# Patient Record
Sex: FEMALE | Race: WHITE | NOT HISPANIC OR LATINO | Employment: UNEMPLOYED | URBAN - METROPOLITAN AREA
[De-identification: names, ages, dates, MRNs, and addresses within clinical notes are randomized per-mention and may not be internally consistent; named-entity substitution may affect disease eponyms.]

---

## 2021-07-19 ENCOUNTER — OFFICE VISIT (OUTPATIENT)
Dept: FAMILY MEDICINE CLINIC | Facility: CLINIC | Age: 71
End: 2021-07-19

## 2021-07-19 VITALS
SYSTOLIC BLOOD PRESSURE: 142 MMHG | BODY MASS INDEX: 39.22 KG/M2 | WEIGHT: 235.4 LBS | TEMPERATURE: 97.3 F | DIASTOLIC BLOOD PRESSURE: 72 MMHG | HEIGHT: 65 IN | OXYGEN SATURATION: 92 % | HEART RATE: 60 BPM

## 2021-07-19 DIAGNOSIS — Z72.0 TOBACCO ABUSE: Primary | ICD-10-CM

## 2021-07-19 DIAGNOSIS — Z87.891 PERSONAL HISTORY OF NICOTINE DEPENDENCE: ICD-10-CM

## 2021-07-19 DIAGNOSIS — G89.29 CHRONIC BILATERAL LOW BACK PAIN WITHOUT SCIATICA: ICD-10-CM

## 2021-07-19 DIAGNOSIS — F32.A ANXIETY AND DEPRESSION: ICD-10-CM

## 2021-07-19 DIAGNOSIS — I10 ESSENTIAL HYPERTENSION: ICD-10-CM

## 2021-07-19 DIAGNOSIS — M54.50 CHRONIC BILATERAL LOW BACK PAIN WITHOUT SCIATICA: ICD-10-CM

## 2021-07-19 DIAGNOSIS — Z12.11 COLON CANCER SCREENING: ICD-10-CM

## 2021-07-19 DIAGNOSIS — F41.9 ANXIETY AND DEPRESSION: ICD-10-CM

## 2021-07-19 LAB
ALBUMIN SERPL-MCNC: 4.1 G/DL (ref 3.5–5.2)
ALBUMIN/GLOB SERPL: 1.4 G/DL
ALP SERPL-CCNC: 95 U/L (ref 39–117)
ALT SERPL W P-5'-P-CCNC: 8 U/L (ref 1–33)
ANION GAP SERPL CALCULATED.3IONS-SCNC: 6.5 MMOL/L (ref 5–15)
AST SERPL-CCNC: 13 U/L (ref 1–32)
BASOPHILS # BLD AUTO: 0.06 10*3/MM3 (ref 0–0.2)
BASOPHILS NFR BLD AUTO: 0.7 % (ref 0–1.5)
BILIRUB SERPL-MCNC: 0.2 MG/DL (ref 0–1.2)
BUN SERPL-MCNC: 12 MG/DL (ref 8–23)
BUN/CREAT SERPL: 15.2 (ref 7–25)
CALCIUM SPEC-SCNC: 8.9 MG/DL (ref 8.6–10.5)
CHLORIDE SERPL-SCNC: 104 MMOL/L (ref 98–107)
CHOLEST SERPL-MCNC: 184 MG/DL (ref 0–200)
CO2 SERPL-SCNC: 29.5 MMOL/L (ref 22–29)
CREAT SERPL-MCNC: 0.79 MG/DL (ref 0.57–1)
DEPRECATED RDW RBC AUTO: 42.4 FL (ref 37–54)
EOSINOPHIL # BLD AUTO: 0.05 10*3/MM3 (ref 0–0.4)
EOSINOPHIL NFR BLD AUTO: 0.6 % (ref 0.3–6.2)
ERYTHROCYTE [DISTWIDTH] IN BLOOD BY AUTOMATED COUNT: 12.4 % (ref 12.3–15.4)
GFR SERPL CREATININE-BSD FRML MDRD: 71 ML/MIN/1.73
GLOBULIN UR ELPH-MCNC: 3 GM/DL
GLUCOSE SERPL-MCNC: 88 MG/DL (ref 65–99)
HCT VFR BLD AUTO: 44.3 % (ref 34–46.6)
HDLC SERPL-MCNC: 50 MG/DL (ref 40–60)
HGB BLD-MCNC: 14.9 G/DL (ref 12–15.9)
IMM GRANULOCYTES # BLD AUTO: 0.03 10*3/MM3 (ref 0–0.05)
IMM GRANULOCYTES NFR BLD AUTO: 0.3 % (ref 0–0.5)
LDLC SERPL CALC-MCNC: 126 MG/DL (ref 0–100)
LDLC/HDLC SERPL: 2.51 {RATIO}
LYMPHOCYTES # BLD AUTO: 2.3 10*3/MM3 (ref 0.7–3.1)
LYMPHOCYTES NFR BLD AUTO: 26.7 % (ref 19.6–45.3)
MCH RBC QN AUTO: 31.4 PG (ref 26.6–33)
MCHC RBC AUTO-ENTMCNC: 33.6 G/DL (ref 31.5–35.7)
MCV RBC AUTO: 93.3 FL (ref 79–97)
MONOCYTES # BLD AUTO: 0.65 10*3/MM3 (ref 0.1–0.9)
MONOCYTES NFR BLD AUTO: 7.5 % (ref 5–12)
NEUTROPHILS NFR BLD AUTO: 5.53 10*3/MM3 (ref 1.7–7)
NEUTROPHILS NFR BLD AUTO: 64.2 % (ref 42.7–76)
NRBC BLD AUTO-RTO: 0 /100 WBC (ref 0–0.2)
PLATELET # BLD AUTO: 285 10*3/MM3 (ref 140–450)
PMV BLD AUTO: 10.2 FL (ref 6–12)
POTASSIUM SERPL-SCNC: 4.6 MMOL/L (ref 3.5–5.2)
PROT SERPL-MCNC: 7.1 G/DL (ref 6–8.5)
RBC # BLD AUTO: 4.75 10*6/MM3 (ref 3.77–5.28)
SODIUM SERPL-SCNC: 140 MMOL/L (ref 136–145)
T4 FREE SERPL-MCNC: 1.15 NG/DL (ref 0.93–1.7)
TRIGL SERPL-MCNC: 43 MG/DL (ref 0–150)
TSH SERPL DL<=0.05 MIU/L-ACNC: 3.45 UIU/ML (ref 0.27–4.2)
VLDLC SERPL-MCNC: 8 MG/DL (ref 5–40)
WBC # BLD AUTO: 8.62 10*3/MM3 (ref 3.4–10.8)

## 2021-07-19 PROCEDURE — 36415 COLL VENOUS BLD VENIPUNCTURE: CPT | Performed by: FAMILY MEDICINE

## 2021-07-19 PROCEDURE — 85025 COMPLETE CBC W/AUTO DIFF WBC: CPT | Performed by: FAMILY MEDICINE

## 2021-07-19 PROCEDURE — 80053 COMPREHEN METABOLIC PANEL: CPT | Performed by: FAMILY MEDICINE

## 2021-07-19 PROCEDURE — 84439 ASSAY OF FREE THYROXINE: CPT | Performed by: FAMILY MEDICINE

## 2021-07-19 PROCEDURE — 84443 ASSAY THYROID STIM HORMONE: CPT | Performed by: FAMILY MEDICINE

## 2021-07-19 PROCEDURE — 99204 OFFICE O/P NEW MOD 45 MIN: CPT | Performed by: FAMILY MEDICINE

## 2021-07-19 PROCEDURE — 80061 LIPID PANEL: CPT | Performed by: FAMILY MEDICINE

## 2021-07-19 RX ORDER — SERTRALINE HYDROCHLORIDE 25 MG/1
25 TABLET, FILM COATED ORAL DAILY
Qty: 90 TABLET | Refills: 1 | Status: SHIPPED | OUTPATIENT
Start: 2021-07-19 | End: 2021-10-25

## 2021-07-19 RX ORDER — HYDROXYZINE PAMOATE 25 MG/1
25 CAPSULE ORAL EVERY 6 HOURS PRN
Qty: 360 CAPSULE | Refills: 1 | Status: SHIPPED | OUTPATIENT
Start: 2021-07-19 | End: 2021-08-02 | Stop reason: CLARIF

## 2021-07-19 RX ORDER — HYDROXYZINE PAMOATE 25 MG/1
25 CAPSULE ORAL EVERY 6 HOURS PRN
COMMUNITY
End: 2021-07-19 | Stop reason: SDUPTHER

## 2021-07-19 RX ORDER — ALBUTEROL SULFATE 90 UG/1
2 AEROSOL, METERED RESPIRATORY (INHALATION) EVERY 6 HOURS PRN
COMMUNITY

## 2021-07-19 RX ORDER — DILTIAZEM HYDROCHLORIDE 360 MG/1
360 CAPSULE, EXTENDED RELEASE ORAL DAILY
COMMUNITY
Start: 2021-06-03 | End: 2021-07-19 | Stop reason: SDUPTHER

## 2021-07-19 RX ORDER — DILTIAZEM HYDROCHLORIDE 360 MG/1
360 CAPSULE, EXTENDED RELEASE ORAL DAILY
Qty: 90 CAPSULE | Refills: 1 | Status: SHIPPED | OUTPATIENT
Start: 2021-07-19

## 2021-07-19 RX ORDER — ENALAPRIL MALEATE 20 MG/1
40 TABLET ORAL DAILY
COMMUNITY
Start: 2021-06-03 | End: 2021-07-19 | Stop reason: SDUPTHER

## 2021-07-19 RX ORDER — HYDROCHLOROTHIAZIDE 25 MG/1
25 TABLET ORAL DAILY
Qty: 90 TABLET | Refills: 1 | Status: SHIPPED | OUTPATIENT
Start: 2021-07-19 | End: 2021-10-25

## 2021-07-19 RX ORDER — TIZANIDINE 2 MG/1
2 TABLET ORAL EVERY 8 HOURS PRN
Qty: 30 TABLET | Refills: 1 | Status: SHIPPED | OUTPATIENT
Start: 2021-07-19 | End: 2021-07-29 | Stop reason: SDUPTHER

## 2021-07-19 RX ORDER — ENALAPRIL MALEATE 20 MG/1
40 TABLET ORAL DAILY
Qty: 180 TABLET | Refills: 1 | Status: SHIPPED | OUTPATIENT
Start: 2021-07-19 | End: 2021-08-26 | Stop reason: SDUPTHER

## 2021-07-19 RX ORDER — MELOXICAM 15 MG/1
15 TABLET ORAL DAILY
Qty: 10 TABLET | Refills: 0 | Status: SHIPPED | OUTPATIENT
Start: 2021-07-19 | End: 2021-07-29 | Stop reason: SDUPTHER

## 2021-07-19 RX ORDER — HYDROCHLOROTHIAZIDE 25 MG/1
25 TABLET ORAL DAILY
COMMUNITY
End: 2021-07-19 | Stop reason: SDUPTHER

## 2021-07-19 NOTE — PROGRESS NOTES
Venipuncture Blood Specimen Collection  Venipuncture performed in LEFT ARM by Kerry De Paz with good hemostasis. Patient tolerated the procedure well without complications.   07/19/21   Kerry De Paz

## 2021-07-20 NOTE — PROGRESS NOTES
Labs show no significant abnormalities except for mildly elevated cholesterol.  Watch diet and exercise.  Follow-up if you have any questions.

## 2021-07-28 ENCOUNTER — TREATMENT (OUTPATIENT)
Dept: PHYSICAL THERAPY | Facility: CLINIC | Age: 71
End: 2021-07-28

## 2021-07-28 DIAGNOSIS — M54.50 CHRONIC BILATERAL LOW BACK PAIN, UNSPECIFIED WHETHER SCIATICA PRESENT: Primary | ICD-10-CM

## 2021-07-28 DIAGNOSIS — R26.9 GAIT DISTURBANCE: ICD-10-CM

## 2021-07-28 DIAGNOSIS — G89.29 CHRONIC BILATERAL LOW BACK PAIN, UNSPECIFIED WHETHER SCIATICA PRESENT: Primary | ICD-10-CM

## 2021-07-28 DIAGNOSIS — M25.561 RIGHT KNEE PAIN, UNSPECIFIED CHRONICITY: ICD-10-CM

## 2021-07-28 PROCEDURE — 97161 PT EVAL LOW COMPLEX 20 MIN: CPT | Performed by: PHYSICAL THERAPIST

## 2021-07-28 PROCEDURE — 97110 THERAPEUTIC EXERCISES: CPT | Performed by: PHYSICAL THERAPIST

## 2021-07-28 NOTE — PROGRESS NOTES
Physical Therapy Initial Evaluation and Plan of Care      Patient: Kallie Rosas   : 1950  Diagnosis/ICD-10 Code:  Chronic bilateral low back pain, unspecified whether sciatica present [M54.5, G89.29]  Referring practitioner: Abner Lopez MD  Date of Initial Visit: 2021  Today's Date: 2021  Patient seen for 1 sessions    Progress note due: 2021  Re-cert due: 10/26/2021           Subjective Questionnaire: Oswestry:       Subjective Evaluation    History of Present Illness    Subjective comment: Pt states she was been having low back pain for a while. The pain stays in her lower back and does not radiate down. She reports having R knee pain and is gives out occasionaly. She reports being able to stand about 10 minutes before having to sit down.  She has cane at home she uses on uneven grounds.  Patient Occupation: retired Pain  Current pain ratin  At best pain ratin  At worst pain rating: 10  Quality: throbbing  Relieving factors: change in position and rest  Aggravating factors: ambulation, lifting, standing, prolonged positioning and repetitive movement    Diagnostic Tests  X-ray: abnormal (levosciolosis )    Treatments  Previous treatment: chiropractic  Patient Goals  Patient goals for therapy: increased strength, independence with ADLs/IADLs, decreased pain and increased motion             Objective          Static Posture     Head  Forward.    Thoracic Spine  Hyperkyphosis.    Lumbar Spine   Increased lordosis.   Lumbar spine (Left): Convex curve.     Pelvis   Anterior pelvic tilt    Hip   Hip (Left): Internally rotated.     Knee   Genu valgus.     Palpation   Left   Tenderness of the erector spinae.     Right   Muscle spasm in the erector spinae.     Tenderness     Lumbar Spine  Tenderness in the spinous process.     Active Range of Motion   Left Knee   Normal active range of motion    Right Knee   Normal active range of motion    Additional Active Range of Motion  Details  75% of WFL for AROM in all directions     Strength/Myotome Testing     Left Hip   Planes of Motion   Flexion: 4  Abduction: 4-  Adduction: 4    Right Hip   Planes of Motion   Flexion: 4-  Abduction: 4-  Adduction: 4    Left Knee   Flexion: 4  Extension: 4-    Right Knee   Flexion: 4  Extension: 4-              Assessment & Plan     Assessment  Impairments: abnormal gait, abnormal or restricted ROM, activity intolerance, impaired physical strength, lacks appropriate home exercise program and pain with function  Assessment details: Pt presents with chronic low back pain and R knee pain that worsens with standing, walking, and chores at home. Pt demonstrates BLE weakness and weakness in core and poor posture. Pt will benefit from skilled PT to address functional deficits and return to PLOF.       Prognosis: good  Functional Limitations: lifting, walking, pulling, pushing, uncomfortable because of pain, standing, stooping and unable to perform repetitive tasks  Goals  Plan Goals: 1. The patient has complaints of pain.   LTG 1: 6 weeks:  The patient will report lower back pain no greater than 2 in order to improve tolerance with activities of daily living and improve sleep quality.    STATUS:  New   STG 1a: 3 weeks:  The patient will report lower back pain no greater than 4.     STATUS:  New   TREATMENT:  Manual therapy, therapeutic exercise, home exercise instruction, aquatic therapy, pelvic traction, and modalities as needed to include: electrical stimulation, ultrasound, moist heat, and ice.     2. Decreased B hip weakness    LTG 2: 6 weeks: Pt will improve bilateral hip flexor and abduction strength to 4+/5 to improve BLE function.     STATUS:  New     STG 2a: 3 weeks:  Pt will improve bilateral hip flexor and abduction strength to 4/5 to improve BLE function    STATUS:  New   TREATMENT: Manual therapy, therapeutic exercise, home exercise instruction, aquatic therapy, pelvic traction, and modalities as  needed to include: electrical stimulation, ultrasound, moist heat, and ice.        3. Mobility: Walking/Moving Around Functional Limitation     LTG 3: 6 weeks:  The patient will demonstrate  ANNE score to 5 to decrease limitation.     STATUS:  New   STG 3a: 3 weeks:  The patient will demonstrate  ANNE score to 10 to decrease limitation.    STATUS:  New   TREATMENT:  Manual therapy, therapeutic exercise, home exercise instruction, and modalities as needed to include: moist heat, electrical stimulation, and ultrasound.      Plan  Therapy options: will be seen for skilled physical therapy services  Planned therapy interventions: flexibility, functional ROM exercises, home exercise program, joint mobilization, manual therapy, neuromuscular re-education, soft tissue mobilization, strengthening, stretching, therapeutic activities and abdominal trunk stabilization  Frequency: 2x week  Duration in weeks: 6  Treatment plan discussed with: patient and family        Visit Diagnoses:    ICD-10-CM ICD-9-CM   1. Chronic bilateral low back pain, unspecified whether sciatica present  M54.5 724.2    G89.29 338.29   2. Right knee pain, unspecified chronicity  M25.561 719.46   3. Gait disturbance  R26.9 781.2       Timed:         Manual Therapy:    0     mins  56537;     Therapeutic Exercise:    8     mins  25400;     Neuromuscular Jairo:    0    mins  42302;    Therapeutic Activity:     0     mins  47836;     Gait Trainin     mins  55688;     Ultrasound:     0     mins  11169;    Ionto                               0    mins   53376  Self-care  __0__ mins 00445    Un-Timed:  Electrical Stimulation:    0     mins  45403 ( );  Traction     0     mins 85940  Low Eval     40    Mins  50221  Mod Eval     0     Mins  70269  High Eval                       0     Mins  54078  Hot pack     0     Mins    Cold pack                       0     Mins      Timed Treatment:   8   mins   Total Treatment:     40   mins    PT SIGNATURE:  Jimy Owens, PT, DPT        Initial Certification  Certification Period: 7/28/2021 thru 10/26/2021  I certify that the therapy services are furnished while this patient is under my care.  The services outlined above are required by this patient, and will be reviewed every 90 days.     PHYSICIAN: Abner Lopez MD      DATE:     Please sign and return via fax to [phone].. Thank you, [facility].

## 2021-07-29 DIAGNOSIS — M54.50 CHRONIC BILATERAL LOW BACK PAIN WITHOUT SCIATICA: ICD-10-CM

## 2021-07-29 DIAGNOSIS — I10 ESSENTIAL HYPERTENSION: ICD-10-CM

## 2021-07-29 DIAGNOSIS — G89.29 CHRONIC BILATERAL LOW BACK PAIN WITHOUT SCIATICA: ICD-10-CM

## 2021-07-29 RX ORDER — MELOXICAM 15 MG/1
15 TABLET ORAL DAILY
Qty: 10 TABLET | Refills: 0 | Status: SHIPPED | OUTPATIENT
Start: 2021-07-29 | End: 2021-08-02 | Stop reason: SDUPTHER

## 2021-07-29 RX ORDER — TIZANIDINE 2 MG/1
2 TABLET ORAL EVERY 8 HOURS PRN
Qty: 30 TABLET | Refills: 1 | Status: SHIPPED | OUTPATIENT
Start: 2021-07-29 | End: 2021-08-02 | Stop reason: SDUPTHER

## 2021-07-29 NOTE — TELEPHONE ENCOUNTER
Caller: OLI LUCEROANTONI    Relationship: Emergency Contact    Best call back number: [phone]       Medication needed:   Requested Prescriptions     Pending Prescriptions Disp Refills   • meloxicam (Mobic) 15 MG tablet 10 tablet 0     Sig: Take 1 tablet by mouth Daily.   • tiZANidine (ZANAFLEX) 2 MG tablet 30 tablet 1     Sig: Take 1 tablet by mouth Every 8 (Eight) Hours As Needed for Muscle Spasms.       When do you need the refill by: ASAP    What additional details did the patient provide when requesting the medication: PATIENT IS OUT OF MELOXICAM    Does the patient have less than a 3 day supply:  [x] Yes  [] No    What is the patient's preferred pharmacy: [facility] - [address] [phone] [place] [phone]

## 2021-08-02 ENCOUNTER — OFFICE VISIT (OUTPATIENT)
Dept: FAMILY MEDICINE CLINIC | Facility: CLINIC | Age: 71
End: 2021-08-02

## 2021-08-02 VITALS
SYSTOLIC BLOOD PRESSURE: 122 MMHG | TEMPERATURE: 97.1 F | OXYGEN SATURATION: 95 % | BODY MASS INDEX: 38.21 KG/M2 | HEART RATE: 81 BPM | DIASTOLIC BLOOD PRESSURE: 72 MMHG | WEIGHT: 229.6 LBS

## 2021-08-02 DIAGNOSIS — F41.9 ANXIETY AND DEPRESSION: ICD-10-CM

## 2021-08-02 DIAGNOSIS — I10 ESSENTIAL HYPERTENSION: Primary | ICD-10-CM

## 2021-08-02 DIAGNOSIS — G89.29 CHRONIC BILATERAL LOW BACK PAIN WITHOUT SCIATICA: ICD-10-CM

## 2021-08-02 DIAGNOSIS — F32.A ANXIETY AND DEPRESSION: ICD-10-CM

## 2021-08-02 DIAGNOSIS — M54.50 CHRONIC BILATERAL LOW BACK PAIN WITHOUT SCIATICA: ICD-10-CM

## 2021-08-02 PROCEDURE — 99214 OFFICE O/P EST MOD 30 MIN: CPT | Performed by: FAMILY MEDICINE

## 2021-08-02 RX ORDER — TIZANIDINE 2 MG/1
2 TABLET ORAL EVERY 8 HOURS PRN
Qty: 60 TABLET | Refills: 5 | Status: SHIPPED | OUTPATIENT
Start: 2021-08-02 | End: 2021-08-26 | Stop reason: SDUPTHER

## 2021-08-02 RX ORDER — MELOXICAM 7.5 MG/1
7.5 TABLET ORAL DAILY
Qty: 30 TABLET | Refills: 5 | Status: SHIPPED | OUTPATIENT
Start: 2021-08-02 | End: 2021-08-26 | Stop reason: SDUPTHER

## 2021-08-02 NOTE — PROGRESS NOTES
Chief Complaint  Hypertension (2 follow up), Anxiety, and Depression    Subjective          Kallie Rosas presents to [facility]  Pt says tizanidine and mobic helps back pain a lot- she has no h/o GI bleed or ulcers, per pt- discussed risk of GI side effects of bleed and ulcer with mobic- she will try lower dose regularily    Hypertension  This is a chronic problem. The current episode started more than 1 year ago. The problem has been resolved since onset. The problem is controlled. Associated symptoms include anxiety. Pertinent negatives include no blurred vision, chest pain, headaches, malaise/fatigue, neck pain, orthopnea, palpitations, peripheral edema, PND, shortness of breath or sweats. There are no associated agents to hypertension. Risk factors for coronary artery disease include post-menopausal state. Current antihypertension treatment includes calcium channel blockers, ACE inhibitors and diuretics. The current treatment provides significant improvement. There are no compliance problems.    Anxiety  Presents for follow-up visit. Patient reports no chest pain, compulsions, confusion, decreased concentration, depressed mood, dizziness, dry mouth, excessive worry, feeling of choking, hyperventilation, insomnia, irritability, malaise, muscle tension, nausea, nervous/anxious behavior, obsessions, palpitations, panic, restlessness, shortness of breath or suicidal ideas. Symptoms occur occasionally. The severity of symptoms is moderate. The quality of sleep is good. Nighttime awakenings: none.     Her past medical history is significant for depression. Compliance with medications is %. Treatment side effects: no side effects.   Depression  Visit Type: follow-up  Patient is not experiencing: anhedonia, chest pain, choking sensation, compulsions, confusion, decreased concentration, depressed mood, dizziness, dry mouth, excessive worry, fatigue, feelings of hopelessness, feelings  of worthlessness, hypersomnia, hyperventilation, insomnia, irritability, malaise, memory impairment, muscle tension, nausea, nervousness/anxiety, obsessions, palpitations, panic, restlessness, shortness of breath, suicidal ideas, suicidal planning, thoughts of death, weight gain and weight loss.  Frequency of symptoms: occasionally   Severity: moderate   Sleep quality: good  Nighttime awakenings: none  Compliance with medications:  %  Treatment side effects: no side effects.      Objective   No Known Allergies    There is no immunization history on file for this patient.    Vital Signs:   Vitals:    08/02/21 1036   BP: 122/72   Pulse: 81   Temp: 97.1 °F (36.2 °C)   SpO2: 95%   Weight: 104 kg (229 lb 9.6 oz)       Physical Exam  Vitals reviewed.   Constitutional:       Appearance: Normal appearance. She is well-developed.   HENT:      Head: Normocephalic and atraumatic.      Right Ear: External ear normal.      Left Ear: External ear normal.      Mouth/Throat:      Pharynx: No oropharyngeal exudate.   Eyes:      Conjunctiva/sclera: Conjunctivae normal.      Pupils: Pupils are equal, round, and reactive to light.   Cardiovascular:      Rate and Rhythm: Normal rate and regular rhythm.      Pulses: Normal pulses.      Heart sounds: Normal heart sounds. No murmur heard.   No friction rub. No gallop.    Pulmonary:      Effort: Pulmonary effort is normal.      Breath sounds: Normal breath sounds. No wheezing or rhonchi.   Abdominal:      General: Bowel sounds are normal. There is no distension.      Palpations: Abdomen is soft.      Tenderness: There is no abdominal tenderness.   Skin:     General: Skin is warm and dry.   Neurological:      Mental Status: She is alert and oriented to person, place, and time.      Cranial Nerves: No cranial nerve deficit.   Psychiatric:         Mood and Affect: Mood and affect normal.         Behavior: Behavior normal.         Thought Content: Thought content normal.         Judgment:  Judgment normal.        Result Review :   The following data was reviewed by: Abner Lopez MD on 08/02/2021:  CMP    CMP 7/19/21   Glucose 88   BUN 12   Creatinine 0.79   eGFR Non African Am 71   Sodium 140   Potassium 4.6   Chloride 104   Calcium 8.9   Albumin 4.10   Total Bilirubin 0.2   Alkaline Phosphatase 95   AST (SGOT) 13   ALT (SGPT) 8           CBC    CBC 7/19/21   WBC 8.62   RBC 4.75   Hemoglobin 14.9   Hematocrit 44.3   MCV 93.3   MCH 31.4   MCHC 33.6   RDW 12.4   Platelets 285           Lipid Panel    Lipid Panel 7/19/21   Total Cholesterol 184   Triglycerides 43   HDL Cholesterol 50   VLDL Cholesterol 8   LDL Cholesterol  126 (A)   LDL/HDL Ratio 2.51   (A) Abnormal value            TSH    TSH 7/19/21   TSH 3.450                     Assessment and Plan    Diagnoses and all orders for this visit:    1. Essential hypertension (Primary)    2. Chronic bilateral low back pain without sciatica  -     meloxicam (Mobic) 7.5 MG tablet; Take 1 tablet by mouth Daily.  Dispense: 30 tablet; Refill: 5  -     tiZANidine (ZANAFLEX) 2 MG tablet; Take 1 tablet by mouth Every 8 (Eight) Hours As Needed for Muscle Spasms.  Dispense: 60 tablet; Refill: 5    3. Anxiety and depression            Follow Up   Return in about 6 months (around 2/2/2022) for Recheck.  Patient was given instructions and counseling regarding her condition or for health maintenance advice. Please see specific information pulled into the AVS if appropriate. Will continue HTN, anxiety and depression meds.

## 2021-08-03 ENCOUNTER — HOSPITAL ENCOUNTER (OUTPATIENT)
Dept: CARDIOLOGY | Facility: HOSPITAL | Age: 71
Discharge: HOME | End: 2021-08-03

## 2021-08-03 ENCOUNTER — HOSPITAL ENCOUNTER (OUTPATIENT)
Dept: CT IMAGING | Facility: HOSPITAL | Age: 71
Discharge: HOME | End: 2021-08-03

## 2021-08-03 DIAGNOSIS — Z72.0 TOBACCO ABUSE: ICD-10-CM

## 2021-08-03 DIAGNOSIS — Z87.891 PERSONAL HISTORY OF NICOTINE DEPENDENCE: ICD-10-CM

## 2021-08-03 DIAGNOSIS — I10 ESSENTIAL HYPERTENSION: ICD-10-CM

## 2021-08-03 LAB
BH CV ECHO MEAS - DIST REN A EDV LEFT: 21.1 CM/S
BH CV ECHO MEAS - DIST REN A PSV LEFT: 75 CM/S
BH CV ECHO MEAS - MID REN A EDV LEFT: 32.8 CM/S
BH CV ECHO MEAS - MID REN A PSV LEFT: 91.8 CM/S
BH CV ECHO MEAS - PROX REN A EDV LEFT: 19 CM/S
BH CV ECHO MEAS - PROX REN A PSV LEFT: 115 CM/S
BH CV VAS BP LEFT ARM: NORMAL MMHG
BH CV VAS BP RIGHT ARM: NORMAL MMHG
BH CV VAS KIDNEY HEIGHT LEFT: 4.79 CM
BH CV VAS RENAL AORTIC MID PSV: 68.8 CM/S
BH CV VAS RENAL HILUM LEFT EDV: 6.67 CM/S
BH CV VAS RENAL HILUM LEFT PSV: 15.2 CM/S
BH CV VAS RENAL HILUM RIGHT EDV: 9.86 CM/S
BH CV VAS RENAL HILUM RIGHT PSV: 27 CM/S
BH CV XLRA MEAS - KID L LEFT: 9.2 CM
BH CV XLRA MEAS DIST REN A EDV RIGHT: 36.2 CM/S
BH CV XLRA MEAS DIST REN A PSV RIGHT: 123 CM/S
BH CV XLRA MEAS KID H RIGHT: 3.76 CM
BH CV XLRA MEAS KID L RIGHT: 9.99 CM
BH CV XLRA MEAS KID W RIGHT: 6.71 CM
BH CV XLRA MEAS MID REN A EDV RIGHT: 39.2 CM/S
BH CV XLRA MEAS MID REN A PSV RIGHT: 151 CM/S
BH CV XLRA MEAS PROX REN A EDV RIGHT: 37 CM/S
BH CV XLRA MEAS PROX REN A PSV RIGHT: 130 CM/S
BH CV XLRA MEAS RAR LEFT: 1.71
BH CV XLRA MEAS RAR RIGHT: 2.22
BH CV XLRA MEAS RENAL A ORG EDV LEFT: 26 CM/S
BH CV XLRA MEAS RENAL A ORG EDV RIGHT: 30.3 CM/S
BH CV XLRA MEAS RENAL A ORG PSV LEFT: 118 CM/S
BH CV XLRA MEAS RENAL A ORG PSV RIGHT: 123 CM/S
LEFT KIDNEY WIDTH: 4.26 CM
LEFT RENAL UPPER PARENCHYMA MAX: 33.4 CM/S
LEFT RENAL UPPER PARENCHYMA MIN: 10 CM/S
LEFT RENAL UPPER PARENCHYMA RI: 0.7
MAXIMAL PREDICTED HEART RATE: 147 BPM
RIGHT RENAL UPPER PARENCHYMA MAX: 41 CM/S
RIGHT RENAL UPPER PARENCHYMA MIN: 14.5 CM/S
RIGHT RENAL UPPER PARENCHYMA RI: 0.65
STRESS TARGET HR: 125 BPM

## 2021-08-03 PROCEDURE — 93975 VASCULAR STUDY: CPT | Performed by: SURGERY

## 2021-08-03 PROCEDURE — 93975 VASCULAR STUDY: CPT

## 2021-08-03 PROCEDURE — 71271 CT THORAX LUNG CANCER SCR C-: CPT

## 2021-08-04 ENCOUNTER — TREATMENT (OUTPATIENT)
Dept: PHYSICAL THERAPY | Facility: CLINIC | Age: 71
End: 2021-08-04

## 2021-08-04 DIAGNOSIS — R26.9 GAIT DISTURBANCE: ICD-10-CM

## 2021-08-04 DIAGNOSIS — M25.561 RIGHT KNEE PAIN, UNSPECIFIED CHRONICITY: ICD-10-CM

## 2021-08-04 DIAGNOSIS — G89.29 CHRONIC BILATERAL LOW BACK PAIN, UNSPECIFIED WHETHER SCIATICA PRESENT: Primary | ICD-10-CM

## 2021-08-04 DIAGNOSIS — M54.50 CHRONIC BILATERAL LOW BACK PAIN, UNSPECIFIED WHETHER SCIATICA PRESENT: Primary | ICD-10-CM

## 2021-08-04 PROCEDURE — 97110 THERAPEUTIC EXERCISES: CPT | Performed by: PHYSICAL THERAPIST

## 2021-08-04 NOTE — PROGRESS NOTES
Physical Therapy Daily Treatment Note      Patient: Kallie Rosas   : 1950  Referring practitioner: No ref. provider found  Date of Initial Visit: Type: THERAPY  Noted: 2021  Today's Date: 2021  Patient seen for 2 sessions         Kallie Rosas reports: her symptoms are little bit better. No complaints of low back or R knee pain right now. 0/10.       Subjective     Objective   See Exercise, Manual, and Modality Logs for complete treatment.       Assessment & Plan     Assessment  Assessment details: Pt tolerated session but unable to tolerate supine or recumbent position for exercises due to discomfort. Pt demonstrated significant bilateral knee valgus during sit to stands indicating hip abductor weakness. Pt reports no low back or R knee pain today. Progress patient next visit as tolerated.         Visit Diagnoses:    ICD-10-CM ICD-9-CM   1. Chronic bilateral low back pain, unspecified whether sciatica present  M54.5 724.2    G89.29 338.29   2. Right knee pain, unspecified chronicity  M25.561 719.46   3. Gait disturbance  R26.9 781.2       Progress per Plan of Care           Timed:         Manual Therapy:    0     mins  80884;     Therapeutic Exercise:    30     mins  03657;     Neuromuscular Jairo:    0    mins  43519;    Therapeutic Activity:     0     mins  28976;     Gait Trainin     mins  20623;     Ultrasound:     0     mins  64220;    Ionto                               0    mins   92366  Self-care  __0__ mins 73702    Un-Timed:  Electrical Stimulation:    0     mins  64485 (MC );  Traction     0     mins 73186  Hot pack     0     Mins    Cold pack                       0     Mins      Timed Treatment:   30   mins   Total Treatment:     30   mins    Jimy Owens PT, DPT  Physical Therapist

## 2021-08-06 ENCOUNTER — TREATMENT (OUTPATIENT)
Dept: PHYSICAL THERAPY | Facility: CLINIC | Age: 71
End: 2021-08-06

## 2021-08-06 DIAGNOSIS — G89.29 CHRONIC BILATERAL LOW BACK PAIN, UNSPECIFIED WHETHER SCIATICA PRESENT: Primary | ICD-10-CM

## 2021-08-06 DIAGNOSIS — M54.50 CHRONIC BILATERAL LOW BACK PAIN, UNSPECIFIED WHETHER SCIATICA PRESENT: Primary | ICD-10-CM

## 2021-08-06 DIAGNOSIS — M25.561 RIGHT KNEE PAIN, UNSPECIFIED CHRONICITY: ICD-10-CM

## 2021-08-06 DIAGNOSIS — R26.9 GAIT DISTURBANCE: ICD-10-CM

## 2021-08-06 PROCEDURE — 97110 THERAPEUTIC EXERCISES: CPT | Performed by: PHYSICAL THERAPIST

## 2021-08-06 NOTE — PROGRESS NOTES
Physical Therapy Daily Treatment Note      Patient: Kallie Rosas   : 1950  Referring practitioner: Abner Lopez MD  Date of Initial Visit: Type: THERAPY  Noted: 2021  Today's Date: 2021  Patient seen for 3 sessions         Kallie Rosas reports: no complaints of R knee or low back pain today.       Subjective     Objective   See Exercise, Manual, and Modality Logs for complete treatment.       Assessment & Plan     Assessment  Assessment details: Pt tolerated progression of LE strengthening exercises well with pain or discomfort reported. Progress patient next visit as tolerated.         Visit Diagnoses:    ICD-10-CM ICD-9-CM   1. Chronic bilateral low back pain, unspecified whether sciatica present  M54.5 724.2    G89.29 338.29   2. Right knee pain, unspecified chronicity  M25.561 719.46   3. Gait disturbance  R26.9 781.2       Progress per Plan of Care           Timed:         Manual Therapy:    0     mins  22058;     Therapeutic Exercise:    30     mins  48339;     Neuromuscular Jairo:    0    mins  56478;    Therapeutic Activity:     0     mins  48458;     Gait Trainin     mins  16313;     Ultrasound:     0     mins  85877;    Ionto                               0    mins   93930  Self-care  __0__ mins 33869    Un-Timed:  Electrical Stimulation:    0     mins  75794 (MC );  Traction     0     mins 85410  Hot pack     0     Mins    Cold pack                       0     Mins      Timed Treatment:   30   mins   Total Treatment:     30   mins    Jimy Owens PT, DPT  Physical Therapist

## 2021-08-09 ENCOUNTER — CLINICAL SUPPORT (OUTPATIENT)
Dept: GASTROENTEROLOGY | Facility: CLINIC | Age: 71
End: 2021-08-09

## 2021-08-09 ENCOUNTER — PREP FOR SURGERY (OUTPATIENT)
Dept: OTHER | Facility: HOSPITAL | Age: 71
End: 2021-08-09

## 2021-08-09 DIAGNOSIS — Z12.11 ENCOUNTER FOR SCREENING FOR MALIGNANT NEOPLASM OF COLON: Primary | ICD-10-CM

## 2021-08-11 ENCOUNTER — TREATMENT (OUTPATIENT)
Dept: PHYSICAL THERAPY | Facility: CLINIC | Age: 71
End: 2021-08-11

## 2021-08-11 DIAGNOSIS — R26.9 GAIT DISTURBANCE: ICD-10-CM

## 2021-08-11 DIAGNOSIS — M25.561 RIGHT KNEE PAIN, UNSPECIFIED CHRONICITY: ICD-10-CM

## 2021-08-11 DIAGNOSIS — G89.29 CHRONIC BILATERAL LOW BACK PAIN, UNSPECIFIED WHETHER SCIATICA PRESENT: Primary | ICD-10-CM

## 2021-08-11 DIAGNOSIS — M54.50 CHRONIC BILATERAL LOW BACK PAIN, UNSPECIFIED WHETHER SCIATICA PRESENT: Primary | ICD-10-CM

## 2021-08-11 PROBLEM — Z12.11 ENCOUNTER FOR SCREENING FOR MALIGNANT NEOPLASM OF COLON: Status: ACTIVE | Noted: 2021-08-11

## 2021-08-11 PROCEDURE — 97140 MANUAL THERAPY 1/> REGIONS: CPT | Performed by: PHYSICAL THERAPIST

## 2021-08-11 PROCEDURE — 97110 THERAPEUTIC EXERCISES: CPT | Performed by: PHYSICAL THERAPIST

## 2021-08-11 NOTE — PROGRESS NOTES
Physical Therapy Daily Progress Note        Patient: Kallie Rosas   : 1950  Diagnosis/ICD-10 Code:  Chronic bilateral low back pain, unspecified whether sciatica present [M54.5, G89.29]  Referring practitioner: Abner Lopez MD  Date of Initial Visit: Type: THERAPY  Noted: 2021  Today's Date: 2021  Patient seen for 4 sessions             Subjective   Kallie Rosas reports having increased pain in left lower back into left leg.  She rates her pain at 9/10 upon arrival.    Objective     Hip PROM:  IR:       L:  10 degrees    R:  20 degrees  ER:     L:  30 degrees     R:  22 degrees    Postural Observations:  Forward flexed trunk-bent knee posture with bilateral Genuvalgum        See Exercise and Manual Logs for complete treatment.       Assessment/Plan  Pt tolerated therapy session moderately well today- with performance of therapeutic exercises, gentle CKC-Functional activities, and Manual therapy. She has improved, but continues to have deficits in Trunk and Bilateral LEs ROM,  Strength, and Stability; limiting function and ability to perform ADLs at this time.  Held Steps ups and bike today secondary to 9/10 pain upon arrival.  Added manual stretching today in effort to decrease pain and tightness in left lower back-hip.  She responded well to therapy interventions - reporting decrease in pain post session.    Plan to continue to progress pt as tolerated as per POC.           Timed:  Manual Therapy:    10     mins  39085;  Therapeutic Exercise:    30     mins  27263;     Neuromuscular Jairo:    0    mins  06665;    Therapeutic Activity:     0     mins  91961;     Gait Trainin     mins  53724;     Ultrasound:     0     mins  04338;    Electrical Stimulation:    0     mins  48240;  Iontophoresis     0     mins  28088    Untimed:  Electrical Stimulation:    0     mins  17352 ( );  Mechanical Traction:    0     mins  78091;   Fluidotherapy     0     mins  13935  Hot pack     0     mins   72077  Cold pack     0     mins  89713    Timed Treatment:   40   mins   Total Treatment:     40   mins        Maira Robles PTA  Physical Therapist Assistant

## 2021-08-18 ENCOUNTER — TELEPHONE (OUTPATIENT)
Dept: FAMILY MEDICINE CLINIC | Facility: CLINIC | Age: 71
End: 2021-08-18

## 2021-08-19 ENCOUNTER — TREATMENT (OUTPATIENT)
Dept: PHYSICAL THERAPY | Facility: CLINIC | Age: 71
End: 2021-08-19

## 2021-08-19 DIAGNOSIS — R26.9 GAIT DISTURBANCE: ICD-10-CM

## 2021-08-19 DIAGNOSIS — G89.29 CHRONIC BILATERAL LOW BACK PAIN, UNSPECIFIED WHETHER SCIATICA PRESENT: Primary | ICD-10-CM

## 2021-08-19 DIAGNOSIS — M54.50 CHRONIC BILATERAL LOW BACK PAIN, UNSPECIFIED WHETHER SCIATICA PRESENT: Primary | ICD-10-CM

## 2021-08-19 DIAGNOSIS — M19.90 ARTHRITIS: Primary | ICD-10-CM

## 2021-08-19 DIAGNOSIS — M25.561 RIGHT KNEE PAIN, UNSPECIFIED CHRONICITY: ICD-10-CM

## 2021-08-19 PROCEDURE — 97110 THERAPEUTIC EXERCISES: CPT | Performed by: PHYSICAL THERAPIST

## 2021-08-19 NOTE — PROGRESS NOTES
Physical Therapy Daily Treatment Note      Patient: Kallie Rosas   : 1950  Referring practitioner: Abner Lopez MD  Date of Initial Visit: Type: THERAPY  Noted: 2021  Today's Date: 2021  Patient seen for 5 sessions         Kallie Rosas reports: she's been hardly able to walk since the last couple sessions due to the pain. She says she felt fine during the session but she was having increased pain in the left hip and knee which continued everyday afterward. She was unable to make last appointment on Friday because of the pain and wasn't sure if she would be able to continue with physical therapy. She is trying to get a hold of Dr. Lopez to discuss her increased pain and get an order for a cane. She would like to be able to walk and stand longer in order to go to the grocery store and go on walks with her friend.       Subjective     Objective          Static Posture     Hip   Hip (Left): Internally rotated.   Hip (Right): Internally rotated.     Knee   Genu valgus.   Knee (Left): Flexed.   Knee (Right): Flexed.     Ambulation   Weight-Bearing Status   Assistive device used: single point cane    Ambulation: Level Surfaces   Ambulation with assistive device: independent  Ambulation without assistive device: independent    Observational Gait   Gait: antalgic   Decreased walking speed, stride length, left stance time, left swing time and left step length.   Left foot contact pattern: forefoot  Left arm swing: decreased  Right arm swing: decreased  Base of support: decreased    Additional Observational Gait Details  B knee valgus    Quality of Movement During Gait   Trunk  Forward lean.     Knee    Knee (Left): Positive valgus.   Knee (Right): Positive valgus.       See Exercise, Manual, and Modality Logs for complete treatment.       Assessment & Plan     Assessment  Assessment details: Increased  L stride length and stance stance time noted when implementing single point cane on the right side. Pt  has improved  Erect posture and decreased forward trunk flexion. Pt also report feeling more stable. Pt also tolerated manual therapy and exercises and reports decrease in pain in the L knee and hip. She also says she feels more stable and less painful with a cane.         Visit Diagnoses:    ICD-10-CM ICD-9-CM   1. Chronic bilateral low back pain, unspecified whether sciatica present  M54.5 724.2    G89.29 338.29   2. Right knee pain, unspecified chronicity  M25.561 719.46   3. Gait disturbance  R26.9 781.2       Continue with same treatment next visit.            Timed:         Manual Therapy:    10     mins  15361;     Therapeutic Exercise:   5 mins  68063;     Neuromuscular Jairo:    0    mins  80886;    Therapeutic Activity:     0     mins  19698;     Gait Trainin     mins  92931;     Ultrasound:     0     mins  13262;    Ionto                               0    mins   59269  Self-care  __0__ mins 12591    Un-Timed:  Electrical Stimulation:    0     mins  80621 (MC );  Traction     0     mins 30635  Hot pack     0     Mins    Cold pack                       0     Mins      Timed Treatment:   17   mins   Total Treatment:     17   mins    Jimy Owens PT, DPT  Physical Therapist

## 2021-08-26 ENCOUNTER — TELEPHONE (OUTPATIENT)
Dept: FAMILY MEDICINE CLINIC | Facility: CLINIC | Age: 71
End: 2021-08-26

## 2021-08-26 ENCOUNTER — TREATMENT (OUTPATIENT)
Dept: PHYSICAL THERAPY | Facility: CLINIC | Age: 71
End: 2021-08-26

## 2021-08-26 DIAGNOSIS — M54.50 CHRONIC BILATERAL LOW BACK PAIN WITHOUT SCIATICA: ICD-10-CM

## 2021-08-26 DIAGNOSIS — G89.29 CHRONIC BILATERAL LOW BACK PAIN, UNSPECIFIED WHETHER SCIATICA PRESENT: Primary | ICD-10-CM

## 2021-08-26 DIAGNOSIS — M25.561 RIGHT KNEE PAIN, UNSPECIFIED CHRONICITY: ICD-10-CM

## 2021-08-26 DIAGNOSIS — M54.50 CHRONIC BILATERAL LOW BACK PAIN, UNSPECIFIED WHETHER SCIATICA PRESENT: Primary | ICD-10-CM

## 2021-08-26 DIAGNOSIS — I10 ESSENTIAL HYPERTENSION: ICD-10-CM

## 2021-08-26 DIAGNOSIS — R26.9 GAIT DISTURBANCE: ICD-10-CM

## 2021-08-26 DIAGNOSIS — G89.29 CHRONIC BILATERAL LOW BACK PAIN WITHOUT SCIATICA: ICD-10-CM

## 2021-08-26 PROCEDURE — 97140 MANUAL THERAPY 1/> REGIONS: CPT | Performed by: PHYSICAL THERAPIST

## 2021-08-26 PROCEDURE — 97110 THERAPEUTIC EXERCISES: CPT | Performed by: PHYSICAL THERAPIST

## 2021-08-26 RX ORDER — ENALAPRIL MALEATE 20 MG/1
40 TABLET ORAL DAILY
Qty: 180 TABLET | Refills: 1 | Status: SHIPPED | OUTPATIENT
Start: 2021-08-26

## 2021-08-26 RX ORDER — MELOXICAM 7.5 MG/1
7.5 TABLET ORAL DAILY
Qty: 30 TABLET | Refills: 5 | Status: SHIPPED | OUTPATIENT
Start: 2021-08-26

## 2021-08-26 RX ORDER — TIZANIDINE 2 MG/1
2 TABLET ORAL EVERY 8 HOURS PRN
Qty: 60 TABLET | Refills: 5 | Status: SHIPPED | OUTPATIENT
Start: 2021-08-26

## 2021-08-26 NOTE — PROGRESS NOTES
Physical Therapy Daily Progress Note        Patient: Kallie Rosas   : 1950  Diagnosis/ICD-10 Code:  Chronic bilateral low back pain, unspecified whether sciatica present [M54.5, G89.29]  Referring practitioner: No ref. provider found  Date of Initial Visit: Type: THERAPY  Noted: 2021  Today's Date: 2021  Patient seen for 6 sessions             Subjective   Kallie Rosas reports: back and knee seem to be a little better, states that left hip bothering her a little more from band exercises at home.     Objective   Minimal discomfort with Soft Tissue Massage to L hip.     See Exercise, Manual, and Modality Logs for complete treatment.       Assessment/Plan  Kallie still experiencing increased low back and knee pain, especially after exercises. Pt had some increased discomfort with Soft Tissue Massage to L hip. Pt would benefit from skilled PT to address Range of Motion  and Strength deficits, pain management and any concerns with ADLs.     Progress per Plan of Care           Timed:  Manual Therapy:    10     mins  77561;  Therapeutic Exercise:    20     mins  76845;     Neuromuscular Jairo:        mins  38502;    Therapeutic Activity:          mins  69826;     Gait Training:           mins  89649;    Aquatic Therapy:          mins  61565;       Untimed:  Electrical Stimulation:         mins  73290 ( );  Mechanical Traction:         mins  41055;       Timed Treatment:   30   mins   Total Treatment:     30   mins        May Barrios PTA  Physical Therapist Assistant

## 2021-08-26 NOTE — TELEPHONE ENCOUNTER
Caller: JOANN LUCERO    Relationship: Emergency Contact    Best call back number: [phone]    What test was performed: CT CHEST LOW DOSE AND DUPLEX SCAN (LEGS)    When was the test performed:08/0/2021    [facility]    Additional notes: [facility] AD[facility] HAS NEVER RECEIVED RESULTS OF TESTS, AND REQUESTS CALLBACK.

## 2021-08-26 NOTE — TELEPHONE ENCOUNTER
Caller: JOANN LUCERO    Relationship: Emergency Contact    Best call back number: [phone]     Medication needed:   Requested Prescriptions     Pending Prescriptions Disp Refills   • enalapril (VASOTEC) 20 MG tablet 180 tablet 1     Sig: Take 2 tablets by mouth Daily.   • tiZANidine (ZANAFLEX) 2 MG tablet 60 tablet 5     Sig: Take 1 tablet by mouth Every 8 (Eight) Hours As Needed for Muscle Spasms.   • meloxicam (Mobic) 7.5 MG tablet 30 tablet 5     Sig: Take 1 tablet by mouth Daily.       When do you need the refill by: ASAP    What additional details did the patient provide when requesting the medication: SHEREE REPORTS THAT PATIENT WAS TOLD TO DOUBLE UP ON THE TIZANIDINE 2MG AND THE MELOXICAM 7.5MG -  PATIENT WOULD LIKE 90 DAY SUPPLY OF NEW DOSAGE.    Does the patient have less than a 3 day supply:  [x] Yes  [] No    What is the patient's preferred pharmacy: [facility] [address] [phone] [facility] [phone] [facility]

## 2021-09-01 ENCOUNTER — TREATMENT (OUTPATIENT)
Dept: PHYSICAL THERAPY | Facility: CLINIC | Age: 71
End: 2021-09-01

## 2021-09-01 DIAGNOSIS — G89.29 CHRONIC BILATERAL LOW BACK PAIN, UNSPECIFIED WHETHER SCIATICA PRESENT: Primary | ICD-10-CM

## 2021-09-01 DIAGNOSIS — M25.561 RIGHT KNEE PAIN, UNSPECIFIED CHRONICITY: ICD-10-CM

## 2021-09-01 DIAGNOSIS — R26.9 GAIT DISTURBANCE: ICD-10-CM

## 2021-09-01 DIAGNOSIS — M54.50 CHRONIC BILATERAL LOW BACK PAIN, UNSPECIFIED WHETHER SCIATICA PRESENT: Primary | ICD-10-CM

## 2021-09-01 PROCEDURE — 97110 THERAPEUTIC EXERCISES: CPT | Performed by: PHYSICAL THERAPIST

## 2021-09-01 NOTE — PROGRESS NOTES
Physical Therapy Discharge Note         Patient: Kallie Rosas   : 1950  Referring practitioner: Abner Lopez MD  Date of Initial Visit: Type: THERAPY  Noted: 2021  Today's Date: 2021  Patient seen for 7 sessions         Kallie Rosas reports: no pain her back today and has been walking a lot better with a cane. She is agreeable to continue with HEP at home and discharge from PT.    ANNE: 14/45    Subjective     Objective          Palpation   Left   No palpable tenderness to the erector spinae.     Right   No palpable tenderness to the erector spinae.     Active Range of Motion   Left Shoulder   Normal active range of motion    Right Shoulder   Normal active range of motion    Strength/Myotome Testing     Left Hip   Planes of Motion   Flexion: 4  Abduction: 4  Adduction: 4    Right Hip   Planes of Motion   Flexion: 4  Abduction: 4  Adduction: 4    Left Knee   Flexion: 4  Extension: 4    Right Knee   Flexion: 4  Extension: 4      See Exercise, Manual, and Modality Logs for complete treatment.       Assessment & Plan     Assessment  Impairments: abnormal gait, abnormal or restricted ROM, activity intolerance, impaired balance, impaired physical strength, pain with function and weight-bearing intolerance  Functional Limitations: walking, uncomfortable because of pain, standing and unable to perform repetitive tasks  Goals  Plan Goals: Plan Goals: 1. The patient has complaints of pain.              LTG 1: 6 weeks:  The patient will report lower back pain no greater than 2 in order to improve tolerance with activities of daily living and improve sleep quality.                          STATUS:  MET              STG 1a: 3 weeks:  The patient will report lower back pain no greater than 4.                           STATUS:  MET              TREATMENT:  Manual therapy, therapeutic exercise, home exercise instruction, aquatic therapy, pelvic traction, and modalities as needed to include: electrical  stimulation, ultrasound, moist heat, and ice.                2. Decreased B hip weakness               LTG 2: 6 weeks: Pt will improve bilateral hip flexor and abduction strength to 4+/5 to improve BLE function.                           STATUS:  New                STG 2a: 3 weeks:  Pt will improve bilateral hip flexor and abduction strength to 4/5 to improve BLE function.                           STATUS:  New              TREATMENT: Manual therapy, therapeutic exercise, home exercise instruction, aquatic therapy, pelvic traction, and modalities as needed to include: electrical stimulation, ultrasound, moist heat, and ice.                              3. Mobility: Walking/Moving Around Functional Limitation                               LTG 3: 6 weeks:  The patient will demonstrate  ANNE score to 5 to decrease limitation.                           STATUS:  New              STG 3a: 3 weeks:  The patient will demonstrate  ANNE score to 10 to decrease limitation.                          STATUS:  New              TREATMENT:  Manual therapy, therapeutic exercise, home exercise instruction, and modalities as needed to include: moist heat, electrical stimulation, and ultrasound.    Plan  Therapy options: will not be seen for skilled physical therapy services  Treatment plan discussed with: patient  Plan details: Discharge and continue with HEP        Visit Diagnoses:    ICD-10-CM ICD-9-CM   1. Chronic bilateral low back pain, unspecified whether sciatica present  M54.5 724.2    G89.29 338.29   2. Right knee pain, unspecified chronicity  M25.561 719.46   3. Gait disturbance  R26.9 781.2                Timed:         Manual Therapy:    0     mins  11548;     Therapeutic Exercise:    23     mins  03358;     Neuromuscular Jairo:  0    mins  46683;    Therapeutic Activity:     0     mins  35639;     Gait Trainin     mins  35825;     Ultrasound:     0     mins  29267;    Ionto                               0    mins    09688  Self-care  __0__ mins 77865    Un-Timed:  Electrical Stimulation:    0     mins  85914 ( );  Traction     0     mins 76945  Hot pack     0     Mins    Cold pack                       0     Mins      Timed Treatment:   25   mins   Total Treatment:     25   mins    Jimy Owens PT, DPT  Physical Therapist

## 2021-09-09 ENCOUNTER — TELEPHONE (OUTPATIENT)
Dept: GASTROENTEROLOGY | Facility: CLINIC | Age: 71
End: 2021-09-09

## 2021-09-09 NOTE — TELEPHONE ENCOUNTER
I called pt to r/s her procedure (9.16.21) due to the situation at [facility]. However, pt wished to cx at this time. Patient verbalized understanding that a screening colonoscopy is the best way to rule out cancers or incurable diseases. She states she will call back to reschedule when she is ready.

## 2021-09-10 ENCOUNTER — OFFICE VISIT (OUTPATIENT)
Dept: FAMILY MEDICINE CLINIC | Facility: CLINIC | Age: 71
End: 2021-09-10

## 2021-09-10 VITALS — OXYGEN SATURATION: 90 % | HEART RATE: 78 BPM | TEMPERATURE: 97 F

## 2021-09-10 DIAGNOSIS — J06.9 ACUTE URI: ICD-10-CM

## 2021-09-10 DIAGNOSIS — R05.9 COUGH: Primary | ICD-10-CM

## 2021-09-10 DIAGNOSIS — Z87.09 HISTORY OF COPD: ICD-10-CM

## 2021-09-10 DIAGNOSIS — R09.89 CHEST CONGESTION: ICD-10-CM

## 2021-09-10 PROBLEM — I10 ESSENTIAL HYPERTENSION: Status: ACTIVE | Noted: 2021-09-10

## 2021-09-10 PROBLEM — F41.9 ANXIETY: Status: ACTIVE | Noted: 2021-09-10

## 2021-09-10 PROBLEM — F17.200 TOBACCO DEPENDENCE SYNDROME: Status: ACTIVE | Noted: 2021-09-10

## 2021-09-10 PROCEDURE — C9803 HOPD COVID-19 SPEC COLLECT: HCPCS | Performed by: NURSE PRACTITIONER

## 2021-09-10 PROCEDURE — 99214 OFFICE O/P EST MOD 30 MIN: CPT | Performed by: NURSE PRACTITIONER

## 2021-09-10 PROCEDURE — U0004 COV-19 TEST NON-CDC HGH THRU: HCPCS | Performed by: NURSE PRACTITIONER

## 2021-09-10 RX ORDER — METHYLPREDNISOLONE 4 MG/1
TABLET ORAL
Qty: 1 EACH | Refills: 0 | Status: SHIPPED | OUTPATIENT
Start: 2021-09-10

## 2021-09-10 RX ORDER — AZITHROMYCIN 250 MG/1
TABLET, FILM COATED ORAL
Qty: 6 TABLET | Refills: 0 | Status: SHIPPED | OUTPATIENT
Start: 2021-09-10

## 2021-09-10 NOTE — PROGRESS NOTES
Chief Complaint  Cough (x 1 week) and URI (chest congestion)    Subjective            Kallie Rosas presents to [facility]  History of Present Illness     Patient reports a history of cough and chest congestion x1 week - denies any shortness of breath at rest or with exertion, but does have wheezing and mild sputum production. Hx of COPD and tobacco use. She has not been vaccinated against COVID-19.     Denies any fever, chills, or body aches. Has mild runny nose, but no sore throat or ear pain. No nausea, vomiting, diarrhea, or abdominal pain. No rashes.       Past Medical History:   Diagnosis Date   • Anxiety 9/10/2021   • Essential hypertension 9/10/2021       No Known Allergies     Past Surgical History:   Procedure Laterality Date   • BLADDER REPAIR     • HYSTERECTOMY          Social History     Tobacco Use   • Smoking status: Current Every Day Smoker     Packs/day: 1.00     Start date: 01/2001   • Smokeless tobacco: Never Used   Vaping Use   • Vaping Use: Former   • Substances: Nicotine   • Devices: Disposable   Substance Use Topics   • Alcohol use: Never   • Drug use: Never       Family History   Problem Relation Age of Onset   • Cancer Sister    • Cancer Sister    • Cancer Sister         Health Maintenance Due   Topic Date Due   • MAMMOGRAM  Never done   • DXA SCAN  Never done   • COLORECTAL CANCER SCREENING  Never done   • COVID-19 Vaccine (1) Never done   • ZOSTER VACCINE (2 of 3) 10/16/2017   • HEPATITIS C SCREENING  Never done   • ANNUAL WELLNESS VISIT  Never done        Current Outpatient Medications on File Prior to Visit   Medication Sig   • albuterol sulfate  (90 Base) MCG/ACT inhaler Inhale 2 puffs Every 6 (Six) Hours As Needed for Wheezing.   • dilTIAZem CD (CARDIZEM CD) 360 MG 24 hr capsule Take 1 capsule by mouth Daily.   • enalapril (VASOTEC) 20 MG tablet Take 2 tablets by mouth Daily.   • hydroCHLOROthiazide (HYDRODIURIL) 25 MG tablet Take 1 tablet by mouth  Daily.   • meloxicam (Mobic) 7.5 MG tablet Take 1 tablet by mouth Daily.   • polyethylene glycol (GoLYTELY) 236 g solution Starting at noon on day prior to procedure, drink 8 ounces every 30 minutes until all gone or stools are clear. May add flavor packet.   • sertraline (Zoloft) 25 MG tablet Take 1 tablet by mouth Daily.   • tiZANidine (ZANAFLEX) 2 MG tablet Take 1 tablet by mouth Every 8 (Eight) Hours As Needed for Muscle Spasms.     No current facility-administered medications on file prior to visit.       Immunization History   Administered Date(s) Administered   • FLUAD TRI 65YR+ 10/09/2020   • Fluzone High Dose =>65 Years ([employer] ONLY) 10/01/2017, 09/17/2018, 10/10/2019   • Influenza TIV (IM) 01/23/2007   • Pneumococcal Conjugate 13-Valent (PCV13) 08/21/2017   • Pneumococcal Polysaccharide (PPSV23) 09/17/2018   • Tdap 10/01/2017   • Zostavax 08/21/2017       Review of Systems     Objective     Pulse 78   Temp 97 °F (36.1 °C)   SpO2 90%       Physical Exam  Vitals reviewed.   Constitutional:       General: She is not in acute distress.     Appearance: Normal appearance. She is well-developed. She is not ill-appearing or diaphoretic.   HENT:      Head: Normocephalic and atraumatic.      Right Ear: Tympanic membrane, ear canal and external ear normal. There is no impacted cerumen.      Left Ear: Tympanic membrane, ear canal and external ear normal. There is no impacted cerumen.      Nose: Nose normal.      Mouth/Throat:      Mouth: Mucous membranes are moist.      Pharynx: Oropharynx is clear. No oropharyngeal exudate or posterior oropharyngeal erythema.   Eyes:      General: No scleral icterus.  Neck:      Trachea: Trachea normal.   Cardiovascular:      Rate and Rhythm: Normal rate and regular rhythm.      Pulses: Normal pulses.      Heart sounds: No murmur heard.     Pulmonary:      Effort: Pulmonary effort is normal. No respiratory distress.      Breath sounds: Wheezing and rhonchi present. No rales.    Musculoskeletal:         General: Normal range of motion.      Cervical back: Normal range of motion and neck supple.      Comments: ROM WNL - patient being evaluated in her vehicle.   Lymphadenopathy:      Cervical: No cervical adenopathy.   Skin:     General: Skin is warm and dry.   Neurological:      Mental Status: She is alert and oriented to person, place, and time.   Psychiatric:         Mood and Affect: Mood and affect normal.         Behavior: Behavior normal.         Thought Content: Thought content normal.         Judgment: Judgment normal.         Result Review :     The following data was reviewed by: JORGE Heck on 09/10/2021:        Data reviewed: Radiologic studies : CXR today, interpreted by radiology, NAD              Assessment and Plan      Diagnoses and all orders for this visit:    1. Cough (Primary)  -     XR Chest PA & Lateral (In Office)  -     COVID-19,CEPHEID/KENNY/BDMAX,COR/CIRA/PAD/RAGHAVENDRA IN-HOUSE(OR EMERGENT/ADD-ON),NP SWAB IN TRANSPORT MEDIA 3-4 HR TAT, RT-PCR - Swab, Nasopharynx    2. Chest congestion  -     XR Chest PA & Lateral (In Office)  -     COVID-19,CEPHEID/KENNY/BDMAX,COR/CIRA/PAD/RAGHAVENDRA IN-HOUSE(OR EMERGENT/ADD-ON),NP SWAB IN TRANSPORT MEDIA 3-4 HR TAT, RT-PCR - Swab, Nasopharynx    3. History of COPD  -     XR Chest PA & Lateral (In Office)    4. Acute URI  -     azithromycin (Zithromax Z-Silvio) 250 MG tablet; Take 2 tablets by mouth on day 1, then 1 tablet daily on days 2-5  Dispense: 6 tablet; Refill: 0  -     methylPREDNISolone (MEDROL) 4 MG dose pack; Take as directed on package instructions.  Dispense: 1 each; Refill: 0            Follow Up     Return if symptoms worsen or fail to improve. We will call her with COVID results once received. To quarantine/self-isolate until results received.     Patient was given instructions and counseling regarding her condition or for health maintenance advice. Please see specific information pulled into the AVS if appropriate.

## 2021-09-13 ENCOUNTER — TELEPHONE (OUTPATIENT)
Dept: FAMILY MEDICINE CLINIC | Facility: CLINIC | Age: 71
End: 2021-09-13

## 2021-10-25 DIAGNOSIS — F41.9 ANXIETY AND DEPRESSION: ICD-10-CM

## 2021-10-25 DIAGNOSIS — F32.A ANXIETY AND DEPRESSION: ICD-10-CM

## 2021-10-25 DIAGNOSIS — I10 ESSENTIAL HYPERTENSION: ICD-10-CM

## 2021-10-25 RX ORDER — HYDROCHLOROTHIAZIDE 25 MG/1
TABLET ORAL
Qty: 90 TABLET | Refills: 1 | Status: SHIPPED | OUTPATIENT
Start: 2021-10-25

## 2021-10-25 RX ORDER — SERTRALINE HYDROCHLORIDE 25 MG/1
TABLET, FILM COATED ORAL
Qty: 90 TABLET | Refills: 1 | Status: SHIPPED | OUTPATIENT
Start: 2021-10-25

## 2022-07-29 NOTE — TELEPHONE ENCOUNTER
Please sign prep request    [FreeTextEntry1] : [facility] 7/22/22 MRI:1. Tricompartmental arthrosis particularly involving the peripheral posterior medial compartment and \par patellofemoral compartment laterally.\par 2. Medial and lateral meniscal degeneration, ligament sprains, extensor mechanism tendinopathy, effusion, \par synovitis, and popliteal cyst with prepatellar soft tissue swelling, moderate pes bursitis and possible popliteal \par cyst rupture.\par 3. No evidence of meniscal tear or acute fracture.

## 2025-02-19 NOTE — PROGRESS NOTES
"Chief Complaint  Annual Exam (fasting), Back Pain (lower back, when walking more than 10 minutes is worse), and Knee Pain (right, \"gives out on her\")    Subjective          Kallie Rosas presents to [facility]  Needs to get established  Pt smokes 1.5ppd x 55 yrs- pt urged to quit- pt will wean down- pt offered meds- she refuses at this time    Depression  Visit Type: follow-up  Patient presents with the following symptoms: excessive worry and nervousness/anxiety.  Patient is not experiencing: anhedonia, chest pain, choking sensation, compulsions, confusion, decreased concentration, depressed mood, dizziness, dry mouth, fatigue, feelings of hopelessness, feelings of worthlessness, hypersomnia, hyperventilation, impotence, insomnia, irritability, malaise, memory impairment, muscle tension, nausea, obsessions, palpitations, panic, psychomotor agitation, psychomotor retardation, restlessness, shortness of breath, suicidal ideas, suicidal planning, thoughts of death, weight gain and weight loss.  Frequency of symptoms: most days   Severity: moderate   Sleep quality: good  Nighttime awakenings: none  Compliance with medications:  %  Treatment side effects: no side effects.  Hypertension  This is a chronic problem. The current episode started more than 1 year ago. The problem has been gradually improving since onset. The problem is uncontrolled. Pertinent negatives include no anxiety, blurred vision, chest pain, headaches, malaise/fatigue, neck pain, orthopnea, palpitations, peripheral edema, PND, shortness of breath or sweats. There are no associated agents to hypertension. Risk factors for coronary artery disease include obesity, post-menopausal state and smoking/tobacco exposure. Current antihypertension treatment includes diuretics, ACE inhibitors and calcium channel blockers. The current treatment provides significant improvement. Compliance problems: pt has not been taking hctz.  " "  Back Pain  This is a chronic problem. The current episode started more than 1 year ago. The problem occurs constantly. The problem has been gradually worsening since onset. The pain is present in the lumbar spine. The quality of the pain is described as aching. The pain does not radiate. The pain is moderate. The pain is the same all the time. The symptoms are aggravated by bending and twisting (walking long distances or standing for long time). Stiffness is present all day. Pertinent negatives include no abdominal pain, bladder incontinence, bowel incontinence, chest pain, dysuria, fever, headaches, leg pain, numbness, paresis, paresthesias, pelvic pain, perianal numbness, tingling, weakness or weight loss. She has tried nothing for the symptoms.       Objective   No Known Allergies    There is no immunization history on file for this patient.    Vital Signs:   Vitals:    07/19/21 0957   BP: 142/72   Pulse: 60   Temp: 97.3 °F (36.3 °C)   SpO2: 92%   Weight: 107 kg (235 lb 6.4 oz)   Height: 165.1 cm (65\")       Physical Exam  Vitals reviewed.   Constitutional:       Appearance: Normal appearance. She is well-developed.   HENT:      Head: Normocephalic and atraumatic.      Right Ear: External ear normal.      Left Ear: External ear normal.      Mouth/Throat:      Pharynx: No oropharyngeal exudate.   Eyes:      Conjunctiva/sclera: Conjunctivae normal.      Pupils: Pupils are equal, round, and reactive to light.   Cardiovascular:      Rate and Rhythm: Normal rate and regular rhythm.      Pulses: Normal pulses.      Heart sounds: Normal heart sounds. No murmur heard.   No friction rub. No gallop.    Pulmonary:      Effort: Pulmonary effort is normal.      Breath sounds: Normal breath sounds. No wheezing or rhonchi.   Abdominal:      General: Bowel sounds are normal. There is no distension.      Palpations: Abdomen is soft.      Tenderness: There is no abdominal tenderness.   Musculoskeletal:      Comments: Bilateral " lower back paraspinal muscle tenderness, neg straight leg raise, no redness, warmth, swelling, or bruising.   Skin:     General: Skin is warm and dry.   Neurological:      Mental Status: She is alert and oriented to person, place, and time.      Cranial Nerves: No cranial nerve deficit.   Psychiatric:         Mood and Affect: Mood and affect normal.         Behavior: Behavior normal.         Thought Content: Thought content normal.         Judgment: Judgment normal.        Result Review :   The following data was reviewed by: Abner Lopez MD on 07/19/2021:    Data reviewed: Radiologic studies I viewed and interpreted 3 views x-rays lumbar spine- no fxs.          Assessment and Plan    Diagnoses and all orders for this visit:    1. Tobacco abuse (Primary)  -      CT Chest Low Dose Cancer Screening WO; Future    2. Personal history of nicotine dependence   -      CT Chest Low Dose Cancer Screening WO; Future    3. Anxiety and depression  -     hydrOXYzine pamoate (VISTARIL) 25 MG capsule; Take 1 capsule by mouth Every 6 (Six) Hours As Needed for Anxiety.  Dispense: 360 capsule; Refill: 1  -     sertraline (Zoloft) 25 MG tablet; Take 1 tablet by mouth Daily.  Dispense: 90 tablet; Refill: 1    4. Essential hypertension  -     Duplex Renal Artery - Bilateral Complete CAR; Future  -     CBC Auto Differential  -     Comprehensive Metabolic Panel  -     Lipid Panel  -     TSH+Free T4  -     dilTIAZem CD (CARDIZEM CD) 360 MG 24 hr capsule; Take 1 capsule by mouth Daily.  Dispense: 90 capsule; Refill: 1  -     enalapril (VASOTEC) 20 MG tablet; Take 2 tablets by mouth Daily.  Dispense: 180 tablet; Refill: 1  -     hydroCHLOROthiazide (HYDRODIURIL) 25 MG tablet; Take 1 tablet by mouth Daily.  Dispense: 90 tablet; Refill: 1    5. Chronic bilateral low back pain without sciatica  -     Ambulatory Referral to Physical Therapy Evaluate and treat  -     XR Spine Lumbar 2 or 3 View (In Office)  -     meloxicam (Mobic) 15 MG  tablet; Take 1 tablet by mouth Daily.  Dispense: 10 tablet; Refill: 0  -     tiZANidine (ZANAFLEX) 2 MG tablet; Take 1 tablet by mouth Every 8 (Eight) Hours As Needed for Muscle Spasms.  Dispense: 30 tablet; Refill: 1    6. Colon cancer screening  -     Ambulatory Referral For Screening Colonoscopy            Follow Up   Return in about 2 weeks (around 8/2/2021) for Recheck.  Patient was given instructions and counseling regarding her condition or for health maintenance advice. Please see specific information pulled into the AVS if appropriate.   Will discuss knee on next visit.     no